# Patient Record
(demographics unavailable — no encounter records)

---

## 2024-10-09 NOTE — PLAN
[FreeTextEntry1] : bp stable  cont meds  low fat diet, exercise encouraged   obesity  previous labs reviewed-- a1c stable, cont with low fat/ low sugar diet, exercise will do trial of contrave reviewed risks, benefits, side effects, alternatives, regimen  will follow up in 2-3 months, sooner if needed

## 2024-10-09 NOTE — HISTORY OF PRESENT ILLNESS
[FreeTextEntry1] : follow up  [de-identified] : 29 yo m presents for follow up with mother  has since stopped metformin-- no weight loss  interested in contrave since ozempic/ weight loss inj not covered  will send new meds

## 2024-11-18 NOTE — HISTORY OF PRESENT ILLNESS
[de-identified] : Irineo is a 29 yo male with autism with h/o rhinitis/nasal congestion and severe sleep apnea. Accompanied by mother.  Interim Hx: Doing well. Using Singular daily- Atrovent nasnal sp PRN and Fluticasone PRN> Notes improvement with Singular.Followed by Pulm- not optimally using CPAP  7/2024 Interim Hx: no new issues. Continues with Singul;ar. Needs refills for Singular and Flonase. Continues to be reluctnt to use night-time CPAP..  PREVIOUS VISIT H/O: Interim Hx: Reviewed labwork- negative testing for aeroallergens- previous SPT also negative. Recently seen by Pulm- has upcoming visit this week. Noted use of Flonase approximately 3 x/week. Rash under axilla has resolved.  PREVIOUS VISIT H/O: 3/4/24 Interim Hx; No new issues. Lab work not completely reported- only charles and dog reported- negative. Continues with Fluticasone - reports approximately 3 times per week. Ootherwise, no new issues. Rash has improved.  .PREVIOUS VISITS: 2/5/24 Interim Hx: Last seen 1/22/24- RAST sent as SPT inconclusive. Here today to review labs- still pending and for concern regarding B?L axillae eruption- R worse than left/ Noted 1 week ago- started old jar of Triamcinolone- went to see PMD last week and Rx'ed hydrocortisone. Nono new meds, foods, or environmental exposures. No recent illness.   1/22/24 Interim Hx: No new issues. Here for skin testing.  PREVIOUS VISIT H/O:: 12/18/23  Rhinitis: Mother reports persistent itchy, runny nose and sneezing. Worse symptoms in spring/fall. Uses a decongestant BID ,azelastine Flonase, Cetirizine.  h/o Sleep Apnea:Obesity, Class II, BMI 35-39.9 (278.00) (E66.9) HST 3/2023: AHI 56.5 4%; AHI 70.8 3%; T88 4%; shadi O2 saturation 66 Airview 06/29/23-07/28/23: Average use of 4 hours 24 minutes, 33% adherence of >4 hours per night of use, therapeutic AHI of 5.0 ,95th percentile pressure 11.4, and minimal leak Airview 8/23-10/23: Average use of 2 hours 7 minutes, 2% adherence of >4 hours per night of use, therapeutic AHI of 1.8 ,95th percentile pressure 10.1 and minimal leak  Enviornmental h/o: No carpets or pets. Non-smoker. Lives with mother.

## 2025-01-13 NOTE — CONSULT LETTER
[Dear  ___] : Dear  [unfilled], [Courtesy Letter:] : I had the pleasure of seeing your patient, [unfilled], in my office today. [Please see my note below.] : Please see my note below. [Consult Closing:] : Thank you very much for allowing me to participate in the care of this patient.  If you have any questions, please do not hesitate to contact me. [Sincerely,] : Sincerely, [FreeTextEntry3] : Betina Rivas MD  [employer] at [facility] Pulmonary, Critical Care, and Sleep Medicine

## 2025-01-13 NOTE — ASSESSMENT
[FreeTextEntry1] : REVIEWED HST 3/2023: AHI 56.5 4%; AHI 70.8 3%; T88 4%; shadi O2 saturation 66 AirView 06/29/23-07/28/23: Average use of 4 hours 24 minutes, 33% adherence of >4 hours per night of use, therapeutic AHI of 5.0 ,95th percentile pressure 11.4, and minimal leak AirView 8/23-10/23: Average use of 2 hours 7 minutes, 2% adherence of >4 hours per night of use, therapeutic AHI of 1.8 ,95th percentile pressure 10.1 and minimal leak AirView 10/23-11/23: Average use of 1 hour, 0% adherence of >4 hours per night of use, therapeutic AHI of 1.4, 95th percentile pressure 10.6 and minimal leak AirView 3/24-4/24: Average use of 1 hour and 16 minutes, 0% adhere of >4 hrs per night of use, therapeutic AHI of 1.0, 95th percentile pressure 9.7 cm H2O and minimal leak AirView 6/11/2024- 09/082024: Average usage of 5 minutes (total days), 52 minutes (days used), 0% >4 hours, AHI 1.8, therapeutic when in use. AirView 10/14-1/11/25: Usage >4hr 0%, avg usage 1hr 36mins, 95th pressure 17, 95th leak 34.3, residual AHI 6.9  27 y/o with autism, HTN, with upper airway wheezing, allergies and JEROD.  #JEROD #Obesity #Noisy breathing - He has not been able to tolerate PAP therapy  - Insurance did not approve heated tube - His weight makes him not an ideal candidate for Inspire; also sensory issues with autism may impact use - He is a candidate for Tirzepatide for both weight loss and JEROD - Will Rx Tirzepatide 2.5mg subcu autoinjector for use once weekly for 4 weeks if tolerated will up titrate dose  - The patient was counseled on the following: -Most common side effects are GI related including nausea, diarrhea, vomiting, constipation, abdominal pain, dyspepsia, and GERD. -Severe GI adverse reactions including pancreatitis, acute cholecystitis, hypoglycemia (if on insulin or insulin secretagogue), diabetic retinopathy, suicidal behavior and ideation have been reported. If any of these develop the patient was advised to stop use and contact the office and if severe, to present to the emergency department. -Anaphylaxis and angioedema have been reported. The patient was instructed to present to the nearest ED if these are to occur, and notify the office subsequently. -Severe aspiration during procedures using general anesthesia a significant association. The medication must be stopped for 2 weeks prior to any procedure requiring general anesthesia. The patient was informed that it is his/her responsibility to notify the procedural team that this medication is being used. -Tirzepatide causes thyroid C-cell tumors in rats but it is not known if this is true in humans. The patient verbalized understanding that using Tirzepatide may be associated with the possible development of thyroid malignancy. As more of the population is using GLP-1 agonists other malignancy associations may emerge and the patient verbalized understanding of this. -Dose adjustments are done monthly, if no side effects. The patient is expected to have follow up appointments on a monthly basis (can alternate between in-office and telemedicine) for us to continue to prescribe Tirzepatide. Once goal dose is reached a follow up is expected every 3 months. The patient verbalized understanding on the above. -If a dose is missed and it has been within 4 days administer the dose as soon as the patient remembers. If more than 4 days have passed, then the dose should be skipped and the next scheduled dose should be administered as planned. Dose adjustments are not needed. The office should be notified if a dose is missed. -The patient was advised to eat smaller meals, avoid fatty foods, hydrate and listen to fullness cues.  #Alergies and nasal congestion - Encouraged adherence with nasal sprays  RTC for first dose of Tirzepatide teaching, and then monthly follow up

## 2025-01-13 NOTE — HISTORY OF PRESENT ILLNESS
[Never] : never [TextBox_4] : 27yo with ASD, HTN referred for "breathing disorder." Mom has noted upper airway wheezing during the day and at night. Does have a deviated septum as her history. Had a recent HST due to excessive snoring. Does not endorse poor sleep. Able to walk extensively and without any issue. No history of asthma or pulmonary issues when younger. Father's family does have JEROD history.  8/9/23: Very concerned about allergies. Allergies and postnasal drip symptoms are making it difficult to utilize CPAP mask. Patient feels better when he uses the mask. He says the breathing is less heavy. Has been using previously prescribed medications including fexofenadine for 2 weeks without response. Has flonase nasal spray but does not like to use it. Also has sudafed and cetirizine. Requesting guidance on management of allergies.  8/16/23: Was started on Ozempic, lost about 10 pounds but was then switched to metformin for insurance issues. Started on trial of budesonide nasal rinse by ENT in August instead of Azelastine/Flonase but patient and mother found it difficult to use. Since then, has been using Azelastine/Flonase but only as needed for sneezing. Has been finding CPAP difficult to use due to his nasal congestion. When he does use it, mother notes improvement in heavy breathing. Has an allergist appointment coming up.  4/16/2024 Seeing allergist; continues on Fluticasone and Azelastine PRN.  9/23/2024: Patient not adherent with PAP. Per his mother he can only tolerate the heated hose. She says that either the DME company or insurance (not sure who she spoke with) is requesting a prescription for heated hose. Unclear about compliance with nasal sprays, he says he uses it but mother is not sure.  1/13/25 He has had issues with being able to tolerate PAP overnight. His mother reports his weight has been somewhat stable. He is having more wheezing and daytime noisy breathing. She was trying to get him Wegovy however the pharmacy told her that a pulmonologist needed to order it for insurance to approve it. She reports that he is using the nasal sprays 3x a week.  Tin[employer]at Airsense 11 Full Face Mask.   [ESS] : 3

## 2025-01-13 NOTE — PHYSICAL EXAM
[No Acute Distress] : no acute distress [IV] : Mallampati Class: IV [Normal Rate/Rhythm] : normal rate/rhythm [Normal S1, S2] : normal s1, s2 [No Resp Distress] : no resp distress [Clear to Auscultation Bilaterally] : clear to auscultation bilaterally [Normal Appearance] : normal appearance [Normal Gait] : normal gait [Oriented x3] : oriented x3

## 2025-02-09 NOTE — PHYSICAL EXAM
[Well Developed] : well developed [No Acute Distress] : no acute distress [Obese] : obese [Normal S1, S2] : normal S1, S2 [No Murmur] : no murmur [No Rub] : no rub [No Gallop] : no gallop [Clear Lung Fields] : clear lung fields [Soft] : abdomen soft [Non Tender] : non-tender [No Edema] : no edema [Alert and Oriented] : alert and oriented [de-identified] : Unable to assess JVP due to body habitus  [de-identified] : Poor respiratory effort. Decreased breath sounds at bases

## 2025-02-09 NOTE — REASON FOR VISIT
[FreeTextEntry1] : Mr. Jain is a 28 year old man with autism with h/o early onset HTN, obesity, HLD and seasonal allergies. Pt was at PCP's office last year when he was noted to be hypertensive 150's-190's. He was started on losartan- HCTZ and referred to cardiology. He was ordered for secondary workup of HTN but did not complete it. Over the last year, he was also started on amlodipine. He works at [employer] and can walk unlimited blocks without any chest pain/ FREY. Denies any palpitations, peripheral edema, orthopnea or syncope. Denies any facial flushing/ headaches.   Since last visit, has been in usual state of health. Trying to exercise by swimming. Adherent to all medications, denies any issues. Started Zepbound 2 weeks ago, no nausea, emesis or abdominal pain. has not noted change in appetite yet but eating less at night.  ECG: NSR with non specific ST changes  Fam hx: HTN ( grandfather)   Social Hx: No tobacco use, no alcohol use. Denies drug use or marijuana use  Diet: Primarily fast foods Exercise: Walks unlimited blocks without any limitations

## 2025-02-09 NOTE — ASSESSMENT
[FreeTextEntry1] : 27 year old man with autism with h/o early onset HTN, obesity, HLD, severe JEROD on CPAP and seasonal allergies. BP was elevated despite being on 3 antihypertensive medications, though not optimized doses, now improved with optimization, CPAP and weight loss. Work-up for other secondary causes negative to date.   #HTN: BP at goal. Sleep study showed severe JEROD. TTE normal. Patient still unable to use CPAP consistently due to mask fit.  - ECG: NSR w/ NSST changes. No signs of LVH - CPAP for severe JEROD, following with Pulm - Renal US for JESSICA pending - Other secondary causes of HTN such as pheochromocytoma, and hyperaldosteronism negative - Continue amlodipine 10 mg daily - Continue losartan- HCTZ to 100mg- 25mg - Counseled on minimizing dietary salt  #Obesity, JEROD: Seeing Pulm though compliance to CPAP is variable. Started Zepbound 2.5 mg, no side effects.  - Discussed continue portion control, walking or swimming for exercise  #Prevention:  - Nutrition consult, patient states not covered by Medicaid - Counselled at length on cutting down on processed foods and carbohydrates and incorporating fruits/ vegetables in his diet, reducing portion size  - Discussed elevated LDL, non-fasting levels, counseled on lifestyle modification including increasing walking and exercise, Mediterranean diet, and portion control as above,

## 2025-02-09 NOTE — PHYSICAL EXAM
[Well Developed] : well developed [No Acute Distress] : no acute distress [Obese] : obese [Normal S1, S2] : normal S1, S2 [No Murmur] : no murmur [No Rub] : no rub [No Gallop] : no gallop [Clear Lung Fields] : clear lung fields [Soft] : abdomen soft [Non Tender] : non-tender [No Edema] : no edema [Alert and Oriented] : alert and oriented [de-identified] : Unable to assess JVP due to body habitus  [de-identified] : Poor respiratory effort. Decreased breath sounds at bases

## 2025-02-09 NOTE — REASON FOR VISIT
[FreeTextEntry1] : Mr. Jain is a 28 year old man with autism with h/o early onset HTN, obesity, HLD and seasonal allergies. Pt was at PCP's office last year when he was noted to be hypertensive 150's-190's. He was started on losartan- HCTZ and referred to cardiology. He was ordered for secondary workup of HTN but did not complete it. Over the last year, he was also started on amlodipine. He works at [employer] and can walk unlimited blocks without any chest pain/ FREY. Denies any palpitations, peripheral edema, orthopnea or syncope. Denies any facial flushing/ headaches.   Since last visit, has been in usual state of health. Trying to exercise by swimming. Adherent to all medications, denies any issues. Started Zepbound 2 weeks ago, no nausea, emesis or abdominal pain. has not noted change in appetite yet but eating less at night.  ECG: NSR with non specific ST changes  Fam hx: HTN ( grandfather)   Social Hx: No tobacco use, no alcohol use. Denies drug use or marijuana use  Diet: Primarily fast foods Exercise: Walks unlimited blocks without any limitations

## 2025-03-10 NOTE — PHYSICAL EXAM
[Alert] : alert [Well Nourished] : well nourished [Healthy Appearance] : healthy appearance [No Acute Distress] : no acute distress [Well Developed] : well developed [Normal Pupil & Iris Size/Symmetry] : normal pupil and iris size and symmetry [No Discharge] : no discharge [No Photophobia] : no photophobia [Normal TMs] : both tympanic membranes were normal [Sclera Not Icteric] : sclera not icteric [Normal Lips/Tongue] : the lips and tongue were normal [Normal Outer Ear/Nose] : the ears and nose were normal in appearance [Normal Tonsils] : normal tonsils [No Thrush] : no thrush [Pale mucosa] : pale mucosa [Boggy Nasal Turbinates] : boggy and/or pale nasal turbinates [Supple] : the neck was supple [Normal Rate and Effort] : normal respiratory rhythm and effort [No Crackles] : no crackles [No Retractions] : no retractions [Bilateral Audible Breath Sounds] : bilateral audible breath sounds [Normal Rate] : heart rate was normal  [Normal S1, S2] : normal S1 and S2 [No murmur] : no murmur [Regular Rhythm] : with a regular rhythm [Soft] : abdomen soft [Not Tender] : non-tender [Not Distended] : not distended [No HSM] : no hepato-splenomegaly [Normal Cervical Lymph Nodes] : cervical [Skin Intact] : skin intact  [No Rash] : no rash [No Skin Lesions] : no skin lesions [No clubbing] : no clubbing [No Edema] : no edema [No Cyanosis] : no cyanosis [Normal Mood] : mood was normal [Normal Affect] : affect was normal [Alert, Awake, Oriented as Age-Appropriate] : alert, awake, oriented as age appropriate

## 2025-03-10 NOTE — HISTORY OF PRESENT ILLNESS
[de-identified] : Irineo is a 27 yo male with autism with h/o rhinitis/nasal congestion and severe sleep apnea. Accompanied by mother.  Interim Hx: Doing well. Admits to con-compliance with CPAP and nasal sprays. Using Zyrtec and Singular.  11/24 Interim Hx: Doing well. Using Singular daily- Atrovent nasnal sp PRN and Fluticasone PRN> Notes improvement with Singular.Followed by Pulm- not optimally using CPAP  7/2024 Interim Hx: no new issues. Continues with Singul;ar. Needs refills for Singular and Flonase. Continues to be reluctnt to use night-time CPAP..  PREVIOUS VISIT H/O: Interim Hx: Reviewed labwork- negative testing for aeroallergens- previous SPT also negative. Recently seen by Pulm- has upcoming visit this week. Noted use of Flonase approximately 3 x/week. Rash under axilla has resolved.  PREVIOUS VISIT H/O: 3/4/24 Interim Hx; No new issues. Lab work not completely reported- only charles and dog reported- negative. Continues with Fluticasone - reports approximately 3 times per week. Ootherwise, no new issues. Rash has improved.  .PREVIOUS VISITS: 2/5/24 Interim Hx: Last seen 1/22/24- RAST sent as SPT inconclusive. Here today to review labs- still pending and for concern regarding B?L axillae eruption- R worse than left/ Noted 1 week ago- started old jar of Triamcinolone- went to see PMD last week and Rx'ed hydrocortisone. Nono new meds, foods, or environmental exposures. No recent illness.   1/22/24 Interim Hx: No new issues. Here for skin testing.  PREVIOUS VISIT H/O:: 12/18/23  Rhinitis: Mother reports persistent itchy, runny nose and sneezing. Worse symptoms in spring/fall. Uses a decongestant BID ,azelastine Flonase, Cetirizine.  h/o Sleep Apnea:Obesity, Class II, BMI 35-39.9 (278.00) (E66.9) HST 3/2023: AHI 56.5 4%; AHI 70.8 3%; T88 4%; shadi O2 saturation 66 Airview 06/29/23-07/28/23: Average use of 4 hours 24 minutes, 33% adherence of >4 hours per night of use, therapeutic AHI of 5.0 ,95th percentile pressure 11.4, and minimal leak Airview 8/23-10/23: Average use of 2 hours 7 minutes, 2% adherence of >4 hours per night of use, therapeutic AHI of 1.8 ,95th percentile pressure 10.1 and minimal leak  Enviornmental h/o: No carpets or pets. Non-smoker. Lives with mother.

## 2025-03-17 NOTE — PHYSICAL EXAM
[Heart Rate And Rhythm] : heart rate was normal and rhythm regular [] : no respiratory distress [Respiration, Rhythm And Depth] : normal respiratory rhythm and effort [Auscultation Breath Sounds / Voice Sounds] : lungs were clear to auscultation bilaterally [Oriented To Time, Place, And Person] : oriented to person, place, and time [General Appearance - Well Developed] : well developed [General Appearance - Well Nourished] : well nourished [Normal Conjunctiva] : the conjunctiva exhibited no abnormalities [Neck Appearance] : the appearance of the neck was normal [Abnormal Walk] : normal gait

## 2025-03-17 NOTE — HISTORY OF PRESENT ILLNESS
[Daytime Somnolence] : daytime somnolence [FreeTextEntry1] : 27yo with ASD, HTN referred for "breathing disorder." Mom has noted upper airway wheezing during the day and at night. Does have a deviated septum as her history. Had a recent HST due to excessive snoring. Does not endorse poor sleep. Able to walk extensively and without any issue. No history of asthma or pulmonary issues when younger. Father's family does have JEROD history.  8/9/23: Very concerned about allergies. Allergies and postnasal drip symptoms are making it difficult to utilize CPAP mask. Patient feels better when he uses the mask. He says the breathing is less heavy. Has been using previously prescribed medications including fexofenadine for 2 weeks without response. Has flonase nasal spray but does not like to use it. Also has sudafed and cetirizine. Requesting guidance on management of allergies.  8/16/23: Was started on Ozempic, lost about 10 pounds but was then switched to metformin for insurance issues. Started on trial of budesonide nasal rinse by ENT in August instead of Azelastine/Flonase but patient and mother found it difficult to use. Since then, has been using Azelastine/Flonase but only as needed for sneezing. Has been finding CPAP difficult to use due to his nasal congestion. When he does use it, mother notes improvement in heavy breathing. Has an allergist appointment coming up.  4/16/2024 Seeing allergist; continues on Fluticasone and Azelastine PRN.  9/23/2024: Patient not adherent with PAP. Per his mother he can only tolerate the heated hose. She says that either the DME company or insurance (not sure who she spoke with) is requesting a prescription for heated hose. Unclear about compliance with nasal sprays, he says he uses it but mother is not sure.  1/13/25 He has had issues with being able to tolerate PAP overnight. His mother reports his weight has been somewhat stable. He is having more wheezing and daytime noisy breathing. She was trying to get him Wegovy however the pharmacy told her that a pulmonologist needed to order it for insurance to approve it. She reports that he is using the nasal sprays 3x a week.  3/17/25 He was started on Tirzepatide was up titrated to 5mg weekly. He had 2 days of diarrhea which has improved. He had some stomach aching on Friday which also resolved. His mother reports he has not been able to use the PAP device as the tubing does not fit well. He has lost 2lbs since his visit 1/13/25  DME [employer] Airsense 11 Full Face Mask.   [ESS] : 16

## 2025-03-17 NOTE — REVIEW OF SYSTEMS
[EDS: ESS=____] : daytime somnolence: ESS=[unfilled] [Obesity] : obesity [Shortness Of Breath] : no shortness of breath [Chest Pain] : no chest pain

## 2025-03-17 NOTE — ASSESSMENT
[FreeTextEntry1] : REVIEWED HST 3/2023: AHI 56.5 4%; AHI 70.8 3%; T88 4%; shadi O2 saturation 66 AirView 06/29/23-07/28/23: Average use of 4 hours 24 minutes, 33% adherence of >4 hours per night of use, therapeutic AHI of 5.0 ,95th percentile pressure 11.4, and minimal leak AirView 8/23-10/23: Average use of 2 hours 7 minutes, 2% adherence of >4 hours per night of use, therapeutic AHI of 1.8 ,95th percentile pressure 10.1 and minimal leak AirView 10/23-11/23: Average use of 1 hour, 0% adherence of >4 hours per night of use, therapeutic AHI of 1.4, 95th percentile pressure 10.6 and minimal leak AirView 3/24-4/24: Average use of 1 hour and 16 minutes, 0% adhere of >4 hrs per night of use, therapeutic AHI of 1.0, 95th percentile pressure 9.7 cm H2O and minimal leak AirView 6/11/2024- 09/082024: Average usage of 5 minutes (total days), 52 minutes (days used), 0% >4 hours, AHI 1.8, therapeutic when in use. AirView 10/14-1/11/25: Usage >4hr 0%, avg usage 1hr 36mins, 95th pressure 17, 95th leak 34.3, residual AHI 6.9  27 y/o male with autism, HTN, with upper airway wheezing, allergies and JEROD.  #JEROD #Obesity - He has not been able to tolerate PAP therapy - His mother will call DME to get new tubing that fits device - Stressed importance of adherence to PAP  - Given recent diarrhea and abdominal pain will continue with 5mg/week dose and will advance dose at next visit to 7.5mg/week; to be started in April 2025 - Will check CMP given recent episodes of diarrhea  RTC in 1 month

## 2025-03-17 NOTE — CONSULT LETTER
[Dear  ___] : Dear  [unfilled], [Courtesy Letter:] : I had the pleasure of seeing your patient, [unfilled], in my office today. [Please see my note below.] : Please see my note below. [Consult Closing:] : Thank you very much for allowing me to participate in the care of this patient.  If you have any questions, please do not hesitate to contact me. [Sincerely,] : Sincerely, [FreeTextEntry3] : Betina Rivas MD  [employer] at [facility] Pulmonary, Critical Care, and Sleep Medicine

## 2025-07-07 NOTE — HISTORY OF PRESENT ILLNESS
[de-identified] : Irineo is a 28 yo male with autism with h/o rhinitis/nasal congestion and severe sleep apnea. Accompanied by mother.  Interim Hx: Doing well. Continues with Singular and Fexofenadine. Notes use of GLP-1 meds- did well with Ozempic but switched over to Zepbound due insurance related issues.Mother notes excessive nausea and GI issues with Zepbound and no weight loss after 3 months of use.Recently increased dose by Pulm.  PREVIOUS VISIT H/O;3/10/25 Interim Hx: Doing well. Admits to con-compliance with CPAP and nasal sprays. Using Zyrtec and Singular.  11/24 Interim Hx: Doing well. Using Singular daily- Atrovent nasnal sp PRN and Fluticasone PRN> Notes improvement with Singular.Followed by Pulm- not optimally using CPAP  7/2024 Interim Hx: no new issues. Continues with Singul;ar. Needs refills for Singular and Flonase. Continues to be reluctnt to use night-time CPAP..  PREVIOUS VISIT H/O: Interim Hx: Reviewed labwork- negative testing for aeroallergens- previous SPT also negative. Recently seen by Pulm- has upcoming visit this week. Noted use of Flonase approximately 3 x/week. Rash under axilla has resolved.  PREVIOUS VISIT H/O: 3/4/24 Interim Hx; No new issues. Lab work not completely reported- only charles and dog reported- negative. Continues with Fluticasone - reports approximately 3 times per week. Ootherwise, no new issues. Rash has improved.  .PREVIOUS VISITS: 2/5/24 Interim Hx: Last seen 1/22/24- RAST sent as SPT inconclusive. Here today to review labs- still pending and for concern regarding B?L axillae eruption- R worse than left/ Noted 1 week ago- started old jar of Triamcinolone- went to see PMD last week and Rx'ed hydrocortisone. Nono new meds, foods, or environmental exposures. No recent illness.   1/22/24 Interim Hx: No new issues. Here for skin testing.  PREVIOUS VISIT H/O:: 12/18/23  Rhinitis: Mother reports persistent itchy, runny nose and sneezing. Worse symptoms in spring/fall. Uses a decongestant BID ,azelastine Flonase, Cetirizine.  h/o Sleep Apnea:Obesity, Class II, BMI 35-39.9 (278.00) (E66.9) HST 3/2023: AHI 56.5 4%; AHI 70.8 3%; T88 4%; shadi O2 saturation 66 Airview 06/29/23-07/28/23: Average use of 4 hours 24 minutes, 33% adherence of >4 hours per night of use, therapeutic AHI of 5.0 ,95th percentile pressure 11.4, and minimal leak Airview 8/23-10/23: Average use of 2 hours 7 minutes, 2% adherence of >4 hours per night of use, therapeutic AHI of 1.8 ,95th percentile pressure 10.1 and minimal leak  Enviornmental h/o: No carpets or pets. Non-smoker. Lives with mother.

## 2025-07-11 NOTE — CONSULT LETTER
[Dear  ___] : Dear  [unfilled], [Courtesy Letter:] : I had the pleasure of seeing your patient, [unfilled], in my office today. [Please see my note below.] : Please see my note below. [Consult Closing:] : Thank you very much for allowing me to participate in the care of this patient.  If you have any questions, please do not hesitate to contact me. [Sincerely,] : Sincerely, [FreeTextEntry3] : Betina Rivas MD  [employer] at [facility] [place] Pulmonary, Critical Care, and Sleep Medicine

## 2025-07-11 NOTE — CONSULT LETTER
[Dear  ___] : Dear  [unfilled], [Courtesy Letter:] : I had the pleasure of seeing your patient, [unfilled], in my office today. [Please see my note below.] : Please see my note below. [Consult Closing:] : Thank you very much for allowing me to participate in the care of this patient.  If you have any questions, please do not hesitate to contact me. [Sincerely,] : Sincerely, [FreeTextEntry3] : Betina Rivas MD  [employer] at [facility] Pulmonary, Critical Care, and Sleep Medicine

## 2025-07-11 NOTE — HISTORY OF PRESENT ILLNESS
[FreeTextEntry1] : 27yo with ASD, HTN referred for "breathing disorder." Mom has noted upper airway wheezing during the day and at night. Does have a deviated septum as her history. Had a recent HST due to excessive snoring. Does not endorse poor sleep. Able to walk extensively and without any issue. No history of asthma or pulmonary issues when younger. Father's family does have JEROD history.  8/9/23: Very concerned about allergies. Allergies and postnasal drip symptoms are making it difficult to utilize CPAP mask. Patient feels better when he uses the mask. He says the breathing is less heavy. Has been using previously prescribed medications including fexofenadine for 2 weeks without response. Has flonase nasal spray but does not like to use it. Also has sudafed and cetirizine. Requesting guidance on management of allergies.  8/16/23: Was started on Ozempic, lost about 10 pounds but was then switched to metformin for insurance issues. Started on trial of budesonide nasal rinse by ENT in August instead of Azelastine/Flonase but patient and mother found it difficult to use. Since then, has been using Azelastine/Flonase but only as needed for sneezing. Has been finding CPAP difficult to use due to his nasal congestion. When he does use it, mother notes improvement in heavy breathing. Has an allergist appointment coming up.  4/16/2024 Seeing allergist; continues on Fluticasone and Azelastine PRN.  9/23/2024: Patient not adherent with PAP. Per his mother he can only tolerate the heated hose. She says that either the DME company or insurance (not sure who she spoke with) is requesting a prescription for heated hose. Unclear about compliance with nasal sprays, he says he uses it but mother is not sure.  1/13/25 He has had issues with being able to tolerate PAP overnight. His mother reports his weight has been somewhat stable. He is having more wheezing and daytime noisy breathing. She was trying to get him Wegovy however the pharmacy told her that a pulmonologist needed to order it for insurance to approve it. She reports that he is using the nasal sprays 3x a week.  3/17/25 He was started on Tirzepatide was up titrated to 5mg weekly. He had 2 days of diarrhea which has improved. He had some stomach aching on Friday which also resolved. His mother reports he has not been able to use the PAP device as the tubing does not fit well. He has lost 2lbs since his visit 1/13/25.  7/9/2025 Titrated up to Zepbound 10 mg weekly. Had diarrhea and an episode of diarrhea stopped use. Zepbound not tolerable at this dose. Also had diarrhea at 7.5 mg. No weight loss with Zepbound. Trying to use PAP but is still struggling. Used Ozempic before and lost weight without side effects but had to stop due to insurance issues.  DME [employer] Airsense 11 Full Face Mask. [ESS] : 16

## 2025-07-11 NOTE — HISTORY OF PRESENT ILLNESS
[FreeTextEntry1] : 25yo with ASD, HTN referred for "breathing disorder." Mom has noted upper airway wheezing during the day and at night. Does have a deviated septum as her history. Had a recent HST due to excessive snoring. Does not endorse poor sleep. Able to walk extensively and without any issue. No history of asthma or pulmonary issues when younger. Father's family does have JEROD history.  8/9/23: Very concerned about allergies. Allergies and postnasal drip symptoms are making it difficult to utilize CPAP mask. Patient feels better when he uses the mask. He says the breathing is less heavy. Has been using previously prescribed medications including fexofenadine for 2 weeks without response. Has flonase nasal spray but does not like to use it. Also has sudafed and cetirizine. Requesting guidance on management of allergies.  8/16/23: Was started on Ozempic, lost about 10 pounds but was then switched to metformin for insurance issues. Started on trial of budesonide nasal rinse by ENT in August instead of Azelastine/Flonase but patient and mother found it difficult to use. Since then, has been using Azelastine/Flonase but only as needed for sneezing. Has been finding CPAP difficult to use due to his nasal congestion. When he does use it, mother notes improvement in heavy breathing. Has an allergist appointment coming up.  4/16/2024 Seeing allergist; continues on Fluticasone and Azelastine PRN.  9/23/2024: Patient not adherent with PAP. Per his mother he can only tolerate the heated hose. She says that either the DME company or insurance (not sure who she spoke with) is requesting a prescription for heated hose. Unclear about compliance with nasal sprays, he says he uses it but mother is not sure.  1/13/25 He has had issues with being able to tolerate PAP overnight. His mother reports his weight has been somewhat stable. He is having more wheezing and daytime noisy breathing. She was trying to get him Wegovy however the pharmacy told her that a pulmonologist needed to order it for insurance to approve it. She reports that he is using the nasal sprays 3x a week.  3/17/25 He was started on Tirzepatide was up titrated to 5mg weekly. He had 2 days of diarrhea which has improved. He had some stomach aching on Friday which also resolved. His mother reports he has not been able to use the PAP device as the tubing does not fit well. He has lost 2lbs since his visit 1/13/25.  7/9/2025 Titrated up to Zepbound 10 mg weekly. Had diarrhea and an episode of diarrhea stopped use. Zepbound not tolerable at this dose. Also had diarrhea at 7.5 mg. No weight loss with Zepbound. Trying to use PAP but is still struggling. Used Ozempic before and lost weight without side effects but had to stop due to insurance issues.  DME [employer] Airsense 11 Full Face Mask. [ESS] : 16

## 2025-07-11 NOTE — ASSESSMENT
[FreeTextEntry1] : REVIEWED HST 3/2023: AHI 56.5 4%; AHI 70.8 3%; T88 4%; shadi O2 saturation 66 AirView 06/29/23-07/28/23: Average use of 4 hours 24 minutes, 33% adherence of >4 hours per night of use, therapeutic AHI of 5.0 ,95th percentile pressure 11.4, and minimal leak AirView 8/23-10/23: Average use of 2 hours 7 minutes, 2% adherence of >4 hours per night of use, therapeutic AHI of 1.8 ,95th percentile pressure 10.1 and minimal leak AirView 10/23-11/23: Average use of 1 hour, 0% adherence of >4 hours per night of use, therapeutic AHI of 1.4, 95th percentile pressure 10.6 and minimal leak AirView 3/24-4/24: Average use of 1 hour and 16 minutes, 0% adhere of >4 hrs per night of use, therapeutic AHI of 1.0, 95th percentile pressure 9.7 cm H2O and minimal leak AirView 6/11/2024- 09/082024: Average usage of 5 minutes (total days), 52 minutes (days used), 0% >4 hours, AHI 1.8, therapeutic when in use. AirView 10/14-1/11/25: Usage >4hr 0%, avg usage 1hr 36mins, 95th pressure 17, 95th leak 34.3, residual AHI 6.9  28 y/o male with autism, HTN, with upper airway wheezing, allergies and JEROD.  #JEROD #Obesity - He has had difficulty tolerating PAP therapy.  - His mother will call DME to get new tubing that fits device - Stressed importance of adherence to PAP - Given recent diarrhea and abdominal pain will reduce Zepbound dose back to 5mg weekly.  - Advised to reach out to PCP to re-prescribe Ozempic given less side effects and weight loss. If able then will transition off Zepbound.  RTC in 2 months

## 2025-07-11 NOTE — ASSESSMENT
[FreeTextEntry1] : REVIEWED HST 3/2023: AHI 56.5 4%; AHI 70.8 3%; T88 4%; shadi O2 saturation 66 AirView 06/29/23-07/28/23: Average use of 4 hours 24 minutes, 33% adherence of >4 hours per night of use, therapeutic AHI of 5.0 ,95th percentile pressure 11.4, and minimal leak AirView 8/23-10/23: Average use of 2 hours 7 minutes, 2% adherence of >4 hours per night of use, therapeutic AHI of 1.8 ,95th percentile pressure 10.1 and minimal leak AirView 10/23-11/23: Average use of 1 hour, 0% adherence of >4 hours per night of use, therapeutic AHI of 1.4, 95th percentile pressure 10.6 and minimal leak AirView 3/24-4/24: Average use of 1 hour and 16 minutes, 0% adhere of >4 hrs per night of use, therapeutic AHI of 1.0, 95th percentile pressure 9.7 cm H2O and minimal leak AirView 6/11/2024- 09/082024: Average usage of 5 minutes (total days), 52 minutes (days used), 0% >4 hours, AHI 1.8, therapeutic when in use. AirView 10/14-1/11/25: Usage >4hr 0%, avg usage 1hr 36mins, 95th pressure 17, 95th leak 34.3, residual AHI 6.9  30 y/o male with autism, HTN, with upper airway wheezing, allergies and JEROD.  #JEROD #Obesity - He has had difficulty tolerating PAP therapy.  - His mother will call DME to get new tubing that fits device - Stressed importance of adherence to PAP - Given recent diarrhea and abdominal pain will reduce Zepbound dose back to 5mg weekly.  - Advised to reach out to PCP to re-prescribe Ozempic given less side effects and weight loss. If able then will transition off Zepbound.  RTC in 2 months

## 2025-07-11 NOTE — END OF VISIT
[FreeTextEntry3] :   I, Betina Rivas MD, personally performed the evaluation and management (E/M) services for this patient. That E/M includes conducting the clinically appropriate initial history &/or exam, assessing all conditions, and establishing the plan of care. I have also independently reviewed the medical records and imaging at the time of this office visit, and discussed the above mentioned images with interpretations with the patient. Today, DERREK Rangel was here to participate in the visit & follow plan of care established by me.